# Patient Record
Sex: MALE | Race: BLACK OR AFRICAN AMERICAN | URBAN - METROPOLITAN AREA
[De-identification: names, ages, dates, MRNs, and addresses within clinical notes are randomized per-mention and may not be internally consistent; named-entity substitution may affect disease eponyms.]

---

## 2022-03-22 ENCOUNTER — LAB OUTSIDE AN ENCOUNTER (OUTPATIENT)
Dept: URBAN - METROPOLITAN AREA CLINIC 80 | Facility: CLINIC | Age: 57
End: 2022-03-22

## 2022-03-22 ENCOUNTER — OFFICE VISIT (OUTPATIENT)
Dept: URBAN - METROPOLITAN AREA CLINIC 80 | Facility: CLINIC | Age: 57
End: 2022-03-22
Payer: COMMERCIAL

## 2022-03-22 ENCOUNTER — WEB ENCOUNTER (OUTPATIENT)
Dept: URBAN - METROPOLITAN AREA CLINIC 80 | Facility: CLINIC | Age: 57
End: 2022-03-22

## 2022-03-22 DIAGNOSIS — K62.5 RECTAL BLEEDING: ICD-10-CM

## 2022-03-22 DIAGNOSIS — Z12.11 COLON CANCER SCREENING: ICD-10-CM

## 2022-03-22 PROCEDURE — 99203 OFFICE O/P NEW LOW 30 MIN: CPT | Performed by: PHYSICIAN ASSISTANT

## 2022-03-22 RX ORDER — POLYETHYLENE GLYCOL 3350, SODIUM SULFATE, SODIUM CHLORIDE, POTASSIUM CHLORIDE, ASCORBIC ACID, SODIUM ASCORBATE 140-9-5.2G
AS DIRECTED KIT ORAL 1
Qty: 1 | Refills: 0 | OUTPATIENT
Start: 2022-03-22 | End: 2022-03-23

## 2022-03-22 NOTE — HPI-TODAY'S VISIT:
Pt thinks he has had a colon in the past - over 10 years ago and he thinks it was normal No abd pain No nausea or emesis No fevers or chills Normal bowel habit is 1 BM a day He went to get his yearly physical and he had a digital exam and for a few days after he had some BRB - he thinks it aggrevated his hemorrhoids and the pcp said he had hemorrhoids No rectal pain No family hx colon cancer or polyps

## 2022-05-17 ENCOUNTER — CLAIMS CREATED FROM THE CLAIM WINDOW (OUTPATIENT)
Dept: URBAN - METROPOLITAN AREA SURGERY CENTER 19 | Facility: SURGERY CENTER | Age: 57
End: 2022-05-17

## 2022-05-17 ENCOUNTER — CLAIMS CREATED FROM THE CLAIM WINDOW (OUTPATIENT)
Dept: URBAN - METROPOLITAN AREA SURGERY CENTER 19 | Facility: SURGERY CENTER | Age: 57
End: 2022-05-17
Payer: COMMERCIAL

## 2022-05-17 DIAGNOSIS — Z12.11 COLON CANCER SCREENING: ICD-10-CM

## 2022-05-17 PROCEDURE — G8907 PT DOC NO EVENTS ON DISCHARG: HCPCS | Performed by: INTERNAL MEDICINE

## 2022-05-17 PROCEDURE — G0121 COLON CA SCRN NOT HI RSK IND: HCPCS | Performed by: INTERNAL MEDICINE

## 2022-08-08 ENCOUNTER — OFFICE VISIT (OUTPATIENT)
Dept: URBAN - METROPOLITAN AREA CLINIC 80 | Facility: CLINIC | Age: 57
End: 2022-08-08
Payer: COMMERCIAL

## 2022-08-08 VITALS
HEIGHT: 71 IN | SYSTOLIC BLOOD PRESSURE: 122 MMHG | TEMPERATURE: 97.4 F | HEART RATE: 67 BPM | WEIGHT: 187.4 LBS | DIASTOLIC BLOOD PRESSURE: 84 MMHG | BODY MASS INDEX: 26.23 KG/M2

## 2022-08-08 DIAGNOSIS — K64.9 HEMORRHOIDS WITHOUT COMPLICATION: ICD-10-CM

## 2022-08-08 DIAGNOSIS — K62.5 RECTAL BLEEDING: ICD-10-CM

## 2022-08-08 DIAGNOSIS — Z12.11 COLON CANCER SCREENING: ICD-10-CM

## 2022-08-08 DIAGNOSIS — K57.90 DIVERTICULOSIS: ICD-10-CM

## 2022-08-08 PROCEDURE — 99213 OFFICE O/P EST LOW 20 MIN: CPT | Performed by: INTERNAL MEDICINE

## 2022-08-08 PROCEDURE — 46221 LIGATION OF HEMORRHOID(S): CPT | Performed by: INTERNAL MEDICINE

## 2022-08-08 NOTE — PHYSICAL EXAM GASTROINTESTINAL
Abdomen , soft, nontender, nondistended , no guarding or rigidity , no masses palpable , normal bowel sounds , Liver and Spleen , no hepatomegaly present , no hepatosplenomegaly , liver nontender , spleen not palpable , Rectal , normal sphincter tone, external skin tags.  brandon with no anal fissure,rectal masses or bleeding present.  s/p banding of left lateral hemorrhoid today.  chaperone offered to patient. lidocaine given.  pt in left lat position.  oreagan  deployed on left lat hemorrhoid.  no pain or bleeding after.

## 2022-08-08 NOTE — HPI-TODAY'S VISIT:
Pt returns for follow up seen recently for colon cancer screening and hx of hematochezia from hemorrhoids colonoscopy in 5/2022 with internal hemorrhoids and diverticular disease.  no polyps noted.  5 yr f/u given he comes in to discuss hemorrhoid banding he notes improved stools since the colonoscopy with 1 bm every 1-2 days with no straining he notes bright red blood on wiping that occurs 1-2 times per week he denies any rectal pain or itching No abd pain normal appetite No nausea or emesis no dysphagia No fevers or chills stable weight  No family hx colon cancer or polyps

## 2022-08-22 ENCOUNTER — OFFICE VISIT (OUTPATIENT)
Dept: URBAN - METROPOLITAN AREA CLINIC 80 | Facility: CLINIC | Age: 57
End: 2022-08-22
Payer: COMMERCIAL

## 2022-08-22 VITALS
TEMPERATURE: 97.9 F | BODY MASS INDEX: 26.38 KG/M2 | DIASTOLIC BLOOD PRESSURE: 99 MMHG | HEIGHT: 71 IN | WEIGHT: 188.4 LBS | HEART RATE: 65 BPM | SYSTOLIC BLOOD PRESSURE: 162 MMHG

## 2022-08-22 DIAGNOSIS — Z12.11 COLON CANCER SCREENING: ICD-10-CM

## 2022-08-22 DIAGNOSIS — K64.9 HEMORRHOIDS WITHOUT COMPLICATION: ICD-10-CM

## 2022-08-22 DIAGNOSIS — K57.90 DIVERTICULOSIS: ICD-10-CM

## 2022-08-22 DIAGNOSIS — K62.5 RECTAL BLEEDING: ICD-10-CM

## 2022-08-22 DIAGNOSIS — R19.4 RECENT CHANGE IN FREQUENCY OF BOWEL MOVEMENTS: ICD-10-CM

## 2022-08-22 PROCEDURE — 99213 OFFICE O/P EST LOW 20 MIN: CPT | Performed by: INTERNAL MEDICINE

## 2022-08-22 PROCEDURE — 46221 LIGATION OF HEMORRHOID(S): CPT | Performed by: INTERNAL MEDICINE

## 2022-08-22 NOTE — HPI-TODAY'S VISIT:
Pt returns for follow up  seen recently for colon cancer screening and hx of hematochezia from hemorrhoids colonoscopy in 5/2022 with internal hemorrhoids and diverticular disease.  no polyps noted.  5 yr f/u given s/p banding of left lateral hemorrhoid on 8/8/2022 he comes in for continued banding  stools remain improved admits to 1 bm every 1-2 days with no straining he did see some blood on wiping yesterday and in the toilet no rectal pain or itching No abd pain normal appetite and eating well no other changes to explain the bleeding yesterday.   No nausea or emesis no dysphagia stable weight  No family hx colon cancer or polyps

## 2022-08-22 NOTE — PHYSICAL EXAM GASTROINTESTINAL
Abdomen , soft, nontender, nondistended , no guarding or rigidity , no masses palpable , normal bowel sounds , Liver and Spleen , no hepatomegaly present , no hepatosplenomegaly , liver nontender , spleen not palpable , Rectal , normal sphincter tone , noted external skin tag.  brandon with no anal fissure, rectal masses or bleeding present.  s/p banding of right posterior hemorrhoid today

## 2022-09-13 ENCOUNTER — OFFICE VISIT (OUTPATIENT)
Dept: URBAN - METROPOLITAN AREA CLINIC 80 | Facility: CLINIC | Age: 57
End: 2022-09-13
Payer: COMMERCIAL

## 2022-09-13 VITALS
BODY MASS INDEX: 26.26 KG/M2 | SYSTOLIC BLOOD PRESSURE: 168 MMHG | DIASTOLIC BLOOD PRESSURE: 98 MMHG | TEMPERATURE: 97.9 F | WEIGHT: 187.6 LBS | HEART RATE: 74 BPM | HEIGHT: 71 IN

## 2022-09-13 DIAGNOSIS — K64.1 GRADE II HEMORRHOIDS: ICD-10-CM

## 2022-09-13 DIAGNOSIS — K57.50 DIVERTICULOSIS OF BOTH SMALL AND LARGE INTESTINE WITHOUT BLEEDING: ICD-10-CM

## 2022-09-13 DIAGNOSIS — K62.5 RECTAL BLEEDING: ICD-10-CM

## 2022-09-13 DIAGNOSIS — R19.4 RECENT CHANGE IN FREQUENCY OF BOWEL MOVEMENTS: ICD-10-CM

## 2022-09-13 PROCEDURE — 46221 LIGATION OF HEMORRHOID(S): CPT | Performed by: INTERNAL MEDICINE

## 2022-09-13 PROCEDURE — 99213 OFFICE O/P EST LOW 20 MIN: CPT | Performed by: INTERNAL MEDICINE

## 2022-09-13 NOTE — HPI-TODAY'S VISIT:
Pt returns for follow up  colonoscopy in 5/2022 with internal hemorrhoids and diverticular disease.  no polyps noted.  5 yr f/u given s/p banding of left lateral hemorrhoid on 8/8/2022 s/p banding of right posterior hemorrhoid on 8/22/2022 he comes in for continued banding  he did well after last banding scant blood but significantly improved bm are better with 1bm every 1-2 days no straining no abd pain normal appetite and eating well No nausea or emesis no dysphagia stable weight no other complaints  No family hx colon cancer or polyps

## 2022-09-13 NOTE — PHYSICAL EXAM GASTROINTESTINAL
Abdomen , soft, nontender, nondistended , no guarding or rigidity , no masses palpable , normal bowel sounds , Liver and Spleen , no hepatomegaly present , no hepatosplenomegaly , liver nontender , spleen not palpable , Rectal , normal sphincter tone , anterior external skin tag.  no external hemorrhoids.  brandon with no rectal masses or bleeding present.  s/p banding of right anterior hemorrhoid today.

## 2022-09-16 PROBLEM — 63532004 DIVERTICULA OF INTESTINE: Status: ACTIVE | Noted: 2022-09-16

## 2022-09-19 ENCOUNTER — OFFICE VISIT (OUTPATIENT)
Dept: URBAN - METROPOLITAN AREA CLINIC 80 | Facility: CLINIC | Age: 57
End: 2022-09-19
Payer: COMMERCIAL

## 2022-09-19 ENCOUNTER — TELEPHONE ENCOUNTER (OUTPATIENT)
Dept: URBAN - METROPOLITAN AREA CLINIC 80 | Facility: CLINIC | Age: 57
End: 2022-09-19

## 2022-09-19 ENCOUNTER — LAB OUTSIDE AN ENCOUNTER (OUTPATIENT)
Dept: URBAN - METROPOLITAN AREA CLINIC 80 | Facility: CLINIC | Age: 57
End: 2022-09-19

## 2022-09-19 VITALS
HEIGHT: 71 IN | SYSTOLIC BLOOD PRESSURE: 160 MMHG | WEIGHT: 186.4 LBS | BODY MASS INDEX: 26.1 KG/M2 | TEMPERATURE: 97.9 F | HEART RATE: 75 BPM | DIASTOLIC BLOOD PRESSURE: 100 MMHG

## 2022-09-19 DIAGNOSIS — R19.4 RECENT CHANGE IN FREQUENCY OF BOWEL MOVEMENTS: ICD-10-CM

## 2022-09-19 DIAGNOSIS — K64.9 HEMORRHOIDS WITHOUT COMPLICATION: ICD-10-CM

## 2022-09-19 DIAGNOSIS — K62.5 RECTAL BLEEDING: ICD-10-CM

## 2022-09-19 DIAGNOSIS — K57.90 DIVERTICULOSIS: ICD-10-CM

## 2022-09-19 LAB
ABSOLUTE BASOPHILS: 38
ABSOLUTE EOSINOPHILS: 113
ABSOLUTE LYMPHOCYTES: 2743
ABSOLUTE MONOCYTES: 545
ABSOLUTE NEUTROPHILS: 1960
BASOPHILS: 0.7
EOSINOPHILS: 2.1
HEMATOCRIT: 43
HEMOGLOBIN: 13.9
LYMPHOCYTES: 50.8
MCH: 26.3
MCHC: 32.3
MCV: 81.4
MONOCYTES: 10.1
MPV: 10.5
NEUTROPHILS: 36.3
PLATELET COUNT: 311
RDW: 12.5
RED BLOOD CELL COUNT: 5.28
WHITE BLOOD CELL COUNT: 5.4

## 2022-09-19 PROCEDURE — 46611 ANOSCOPY: CPT | Performed by: INTERNAL MEDICINE

## 2022-09-19 PROCEDURE — 46600 DIAGNOSTIC ANOSCOPY SPX: CPT | Performed by: INTERNAL MEDICINE

## 2022-09-19 PROCEDURE — 99213 OFFICE O/P EST LOW 20 MIN: CPT | Performed by: INTERNAL MEDICINE

## 2022-09-19 NOTE — HPI-TODAY'S VISIT:
Pt returns for follow up  colonoscopy in 5/2022 with internal hemorrhoids and diverticular disease.  no polyps noted.  5 yr f/u given s/p banding of left lateral hemorrhoid on 8/8/2022 s/p banding of right posterior hemorrhoid on 8/22/2022 s/p banding of right anterior hemorrhoid on 9/13/2022 he comes in for continued bleeding after last banding  he  noted hematochezia that started on thursday it has continued since bright red blood with and without stools several times per day no rectal pain no abdominal pain he notes constipation with straining in the past week denies any dizziness or light headedness.   bm are normal with 1bm every 1-2 days no straining normal appetite and eating well No nausea or emesis no dysphagia stable weight no other complaints     No family hx colon cancer or polyps

## 2022-09-19 NOTE — PHYSICAL EXAM GASTROINTESTINAL
Abdomen , soft, nontender, nondistended , no guarding or rigidity , no masses palpable , normal bowel sounds , Liver and Spleen , no hepatomegaly present , no hepatosplenomegaly , liver nontender , spleen not palpable , Rectal , normal sphincter tone ,  external skin tag.  brandon with signs of post anal fissure. some red blood noted.  no rectal masses.  i saw flush of blood initially posteriorly.  i could not recreate it to treat the area with silver nitrate.

## 2022-09-28 ENCOUNTER — OFFICE VISIT (OUTPATIENT)
Dept: URBAN - METROPOLITAN AREA CLINIC 80 | Facility: CLINIC | Age: 57
End: 2022-09-28
Payer: COMMERCIAL

## 2022-09-28 ENCOUNTER — DASHBOARD ENCOUNTERS (OUTPATIENT)
Age: 57
End: 2022-09-28

## 2022-09-28 VITALS — WEIGHT: 188 LBS | BODY MASS INDEX: 26.32 KG/M2 | HEIGHT: 71 IN

## 2022-09-28 DIAGNOSIS — Z12.11 COLON CANCER SCREENING: ICD-10-CM

## 2022-09-28 DIAGNOSIS — R19.4 RECENT CHANGE IN FREQUENCY OF BOWEL MOVEMENTS: ICD-10-CM

## 2022-09-28 DIAGNOSIS — K57.90 DIVERTICULOSIS: ICD-10-CM

## 2022-09-28 DIAGNOSIS — K62.5 RECTAL BLEEDING: ICD-10-CM

## 2022-09-28 DIAGNOSIS — K64.9 HEMORRHOIDS WITHOUT COMPLICATION: ICD-10-CM

## 2022-09-28 PROBLEM — 397881000: Status: ACTIVE | Noted: 2022-08-08

## 2022-09-28 PROCEDURE — 99212 OFFICE O/P EST SF 10 MIN: CPT | Performed by: PHYSICIAN ASSISTANT

## 2022-09-28 NOTE — HPI-TODAY'S VISIT:
Pt returns for follow up  colonoscopy in 5/2022 with internal hemorrhoids and diverticular disease.  no polyps noted.  5 yr f/u given s/p banding of left lateral hemorrhoid on 8/8/2022 s/p banding of right posterior hemorrhoid on 8/22/2022 s/p banding of right anterior hemorrhoid on 9/13/2022 CBC 9/19/22 -- wnl No bleeding since last week he has not had to use the suppositories and it was not covered by insurance so he never got them He is having 1 BM a day no rectal pain He is feeling a lot better he has not tried fiber yet because he feels like his stools are regular and he is not constipated